# Patient Record
Sex: MALE | Race: OTHER | HISPANIC OR LATINO | Employment: FULL TIME | URBAN - METROPOLITAN AREA
[De-identification: names, ages, dates, MRNs, and addresses within clinical notes are randomized per-mention and may not be internally consistent; named-entity substitution may affect disease eponyms.]

---

## 2019-11-30 ENCOUNTER — HOSPITAL ENCOUNTER (EMERGENCY)
Facility: HOSPITAL | Age: 54
Discharge: HOME | End: 2019-11-30
Attending: EMERGENCY MEDICINE

## 2019-11-30 VITALS
BODY MASS INDEX: 30.14 KG/M2 | HEART RATE: 119 BPM | OXYGEN SATURATION: 96 % | WEIGHT: 198.88 LBS | SYSTOLIC BLOOD PRESSURE: 134 MMHG | DIASTOLIC BLOOD PRESSURE: 72 MMHG | RESPIRATION RATE: 20 BRPM | HEIGHT: 68 IN | TEMPERATURE: 100 F

## 2019-11-30 DIAGNOSIS — J06.9 VIRAL URI: Primary | ICD-10-CM

## 2019-11-30 DIAGNOSIS — R05.9 COUGH: ICD-10-CM

## 2019-11-30 DIAGNOSIS — Z76.0 MEDICATION REFILL: ICD-10-CM

## 2019-11-30 LAB
INFLUENZA A, MOLECULAR: NEGATIVE
INFLUENZA B, MOLECULAR: NEGATIVE
POCT GLUCOSE: 139 MG/DL (ref 70–110)
SPECIMEN SOURCE: NORMAL

## 2019-11-30 PROCEDURE — 87502 INFLUENZA DNA AMP PROBE: CPT

## 2019-11-30 PROCEDURE — 99284 EMERGENCY DEPT VISIT MOD MDM: CPT | Mod: 25

## 2019-11-30 PROCEDURE — 82962 GLUCOSE BLOOD TEST: CPT

## 2019-11-30 RX ORDER — METFORMIN HYDROCHLORIDE 1000 MG/1
1000 TABLET ORAL 2 TIMES DAILY WITH MEALS
Qty: 30 TABLET | Refills: 0 | Status: SHIPPED | OUTPATIENT
Start: 2019-11-30

## 2019-11-30 RX ORDER — GLIPIZIDE 5 MG/1
5 TABLET ORAL
Qty: 30 TABLET | Refills: 0 | Status: SHIPPED | OUTPATIENT
Start: 2019-11-30

## 2019-11-30 RX ORDER — METFORMIN HYDROCHLORIDE 1000 MG/1
1000 TABLET ORAL 2 TIMES DAILY WITH MEALS
COMMUNITY
End: 2019-11-30 | Stop reason: SDUPTHER

## 2019-11-30 RX ORDER — GLIPIZIDE 5 MG/1
5 TABLET ORAL
COMMUNITY
End: 2019-11-30 | Stop reason: SDUPTHER

## 2019-11-30 NOTE — ED PROVIDER NOTES
Encounter Date: 11/30/2019       History     Chief Complaint   Patient presents with    Fever     pt reports he has not been feeling well, fever, weakness, cold. Pt states he also needs a refill of his medication and has been depressed.      54 year old male with complaint of body aches, cough and runny nose X 2 days.  Reports feeling weak and fatigued.  Pt also reports being out of diabetic medications X 2 months. Denies nausea or vomiting. Denies recorded fevers.          Review of patient's allergies indicates:  No Known Allergies  Past Medical History:   Diagnosis Date    Diabetes mellitus      History reviewed. No pertinent surgical history.  Family History   Problem Relation Age of Onset    Diabetes Neg Hx     Hypertension Neg Hx      Social History     Tobacco Use    Smoking status: Never Smoker   Substance Use Topics    Alcohol use: No    Drug use: No     Review of Systems   Constitutional: Negative for fever.   HENT: Positive for congestion. Negative for sore throat.    Respiratory: Positive for cough. Negative for shortness of breath.    Cardiovascular: Negative for chest pain.   Gastrointestinal: Negative for nausea.   Genitourinary: Negative for dysuria.   Musculoskeletal: Negative for back pain.   Skin: Negative for rash.   Neurological: Negative for weakness.   Hematological: Does not bruise/bleed easily.       Physical Exam     Initial Vitals [11/30/19 1614]   BP Pulse Resp Temp SpO2   134/72 (!) 119 20 99.7 °F (37.6 °C) 96 %      MAP       --         Physical Exam    Nursing note and vitals reviewed.  Constitutional: He appears well-developed and well-nourished.   HENT:   Head: Normocephalic and atraumatic.   + nasal congestion    Eyes: Conjunctivae are normal. Pupils are equal, round, and reactive to light.   Neck: Normal range of motion. Neck supple.   Cardiovascular: Normal rate, regular rhythm, normal heart sounds and intact distal pulses.   Pulmonary/Chest: Breath sounds normal.    Abdominal: Soft. There is no rebound and no guarding.   Musculoskeletal: Normal range of motion.   Neurological: He is alert.   Skin: Skin is warm and dry.   Psychiatric: He has a normal mood and affect. His behavior is normal. Thought content normal.         ED Course   Procedures  Labs Reviewed   POCT GLUCOSE - Abnormal; Notable for the following components:       Result Value    POCT Glucose 139 (*)     All other components within normal limits   INFLUENZA A & B BY MOLECULAR   POCT GLUCOSE MONITORING CONTINUOUS          Imaging Results          X-Ray Chest PA And Lateral (Final result)  Result time 11/30/19 16:49:32    Final result by Cornell Santos MD (11/30/19 16:49:32)                 Impression:      No acute process seen.      Electronically signed by: Cornell Santos MD  Date:    11/30/2019  Time:    16:49             Narrative:    EXAMINATION:  XR CHEST PA AND LATERAL    CLINICAL HISTORY:  Cough    COMPARISON:  None    FINDINGS:  Cardiac silhouette is normal.  The lungs demonstrate no evidence of active disease.  No evidence of pleural effusion or pneumothorax.  Bones appear intact.                                        6:36 PM  I discussed with patient that the evaluation in the emergency department does not suggest any emergent or life threatening medical condition requiring immediate intervention beyond what was provided in the ED, and I believe patient is safe for discharge.  Regardless, an unremarkable evaluation in the ED does not preclude the development or presence of a serious of life threatening condition. As such, patient was instructed to return immediately for any worsening or change in current symptoms. I also discussed the results of my evaluation and diagnostics with patient and he concurs with the evaluation and management plan.  Detailed written and verbal instructions provided to patient and he expressed a verbal understanding of the discharge instructions and overall management plan.  Reiterated the importance of medication administration and safety and advised patient to follow up with primary care provider in 3-5 days or sooner if needed.  Also advised patient to return to the ER for any complications.                            Clinical Impression:       ICD-10-CM ICD-9-CM   1. Viral URI J06.9 465.9   2. Cough R05 786.2   3. Medication refill Z76.0 V68.1                             Rafael Manuel NP  11/30/19 0461